# Patient Record
Sex: FEMALE
[De-identification: names, ages, dates, MRNs, and addresses within clinical notes are randomized per-mention and may not be internally consistent; named-entity substitution may affect disease eponyms.]

---

## 2021-12-17 ENCOUNTER — TRANSCRIPTION ENCOUNTER (OUTPATIENT)
Age: 26
End: 2021-12-17

## 2022-01-12 ENCOUNTER — TRANSCRIPTION ENCOUNTER (OUTPATIENT)
Age: 27
End: 2022-01-12

## 2022-02-17 ENCOUNTER — TRANSCRIPTION ENCOUNTER (OUTPATIENT)
Age: 27
End: 2022-02-17

## 2022-02-18 ENCOUNTER — TRANSCRIPTION ENCOUNTER (OUTPATIENT)
Age: 27
End: 2022-02-18

## 2022-02-26 ENCOUNTER — TRANSCRIPTION ENCOUNTER (OUTPATIENT)
Age: 27
End: 2022-02-26

## 2022-03-03 ENCOUNTER — TRANSCRIPTION ENCOUNTER (OUTPATIENT)
Age: 27
End: 2022-03-03

## 2022-05-19 ENCOUNTER — NON-APPOINTMENT (OUTPATIENT)
Age: 27
End: 2022-05-19

## 2022-06-27 ENCOUNTER — NON-APPOINTMENT (OUTPATIENT)
Age: 27
End: 2022-06-27

## 2022-09-10 ENCOUNTER — NON-APPOINTMENT (OUTPATIENT)
Age: 27
End: 2022-09-10

## 2022-10-11 ENCOUNTER — NON-APPOINTMENT (OUTPATIENT)
Age: 27
End: 2022-10-11

## 2022-12-21 ENCOUNTER — NON-APPOINTMENT (OUTPATIENT)
Age: 27
End: 2022-12-21

## 2023-02-20 ENCOUNTER — NON-APPOINTMENT (OUTPATIENT)
Age: 28
End: 2023-02-20

## 2023-08-11 ENCOUNTER — NON-APPOINTMENT (OUTPATIENT)
Age: 28
End: 2023-08-11

## 2023-08-23 PROBLEM — Z00.00 ENCOUNTER FOR PREVENTIVE HEALTH EXAMINATION: Status: ACTIVE | Noted: 2023-08-23

## 2023-08-24 ENCOUNTER — APPOINTMENT (OUTPATIENT)
Dept: ORTHOPEDIC SURGERY | Facility: CLINIC | Age: 28
End: 2023-08-24
Payer: COMMERCIAL

## 2023-08-24 ENCOUNTER — TRANSCRIPTION ENCOUNTER (OUTPATIENT)
Age: 28
End: 2023-08-24

## 2023-08-24 VITALS
OXYGEN SATURATION: 99 % | SYSTOLIC BLOOD PRESSURE: 111 MMHG | HEIGHT: 63 IN | WEIGHT: 130 LBS | HEART RATE: 74 BPM | BODY MASS INDEX: 23.04 KG/M2 | DIASTOLIC BLOOD PRESSURE: 79 MMHG

## 2023-08-24 DIAGNOSIS — S93.492A SPRAIN OF OTHER LIGAMENT OF LEFT ANKLE, INITIAL ENCOUNTER: ICD-10-CM

## 2023-08-24 PROCEDURE — 99203 OFFICE O/P NEW LOW 30 MIN: CPT

## 2023-08-24 NOTE — ASSESSMENT
[FreeTextEntry1] : IRINA WEBER is a 28 year old female with left foot and ankle pain. I discussed with the patient that their symptoms, signs, and imaging are most consistent with ATFL sprain. We reviewed the natural history of this condition and treatment options. We agreed on the following plan:  Xray reviewed with patient today. Small calcification dorsal to navicular of no clinical significance. Start Home Exercises for ankle conditioning. Demonstration and handout provided.  Physical therapy. Referral provided. Advised lace up or velcro brace. Examples and referral provided. Advanced imaging: consider MRI if no symptomatic improvement.  Follow up in 6-8 weeks.

## 2023-08-24 NOTE — PHYSICAL EXAM
[de-identified] : General: Well-nourished, well-developed, alert, and in no acute distress. Head: Normocephalic. Eyes: Pupils equal, extraocular muscles intact, normal sclera. Nose: No nasal discharge. Cardiovascular: Extremities are warm and well perfused. Distal pulses are symmetric bilaterally. Respiratory: No labored breathing. Extremities: Sensation is intact distally bilaterally. Distal pulses are symmetric bilaterally Lymphatic: No regional lymphadenopathy, no lymphedema Neurologic: No focal deficits Skin: Normal skin color, texture, and turgor Psychiatric: Normal affect   Examination of left ankle Gait normal, non-antalgic Able to toe walk, heel walk (with some discomfort) Ambulating independently Inspection: mild effusion, yellow hematoma dorsolateral aspect of foot. Tender to palpation: lateral malleolus, ATFL, CFL, peroneals Nontender to palpation: medial malleolus, base of 5th metatarsal, navicular,PTFL, AITFL, Achilles tendon, PT, FHL, tibialis anterior, plantar fascia  ROM: Plantar flexion 45 deg, dorsiflexion 20 deg, inversion 20 deg, eversion 30 deg Special Tests:  Anterior Drawer (ATFL): mild discomfort without laxity Talar Tilt (CFL): negative for pain and laxity Kleigers (ext rot): negative for pain and laxity at deltoid ligament or distal fibula Squeeze test: negative at proximal or distal syndesmosis Bump test: negative at talar dome, syndesmosis Sarmiento test negative  Sensation is intact to light touch over the superficial and deep peroneal nerve distributions and the posterior tibial nerve distribution. Capillary refill is less than two seconds. Posterior tibial and dorsalis pedis pulses 2+ equal bilaterally. No calf swelling or tenderness bilaterally. Strength testing shows 5/5 Quads, 5/5 Hamstrings, 5/5 EHL, 5/5 FHL, 5/5 tibialis anterior, 5/5 gastroc-soleus complex.  Reflexes: Patellar 2+, Achilles 2+.   [de-identified] : XR left foot and ankle [facility] (8/12/23):  There is no evidence of fracture or dislocation. The ankle mortise is intact. Tiny irregular densities dorsal to the talus and navicular which may represent age-indeterminate tiny cortical avulsion fragments versus chronic capsular ossification.

## 2023-08-24 NOTE — HISTORY OF PRESENT ILLNESS
[de-identified] : IRINA WEBER is a 28 year old female who presents with left foot and ankle pain. States the onset of pain was 8/12/23. Missed a step and twisted ankle. Able to weight bear. Presented to Urgent Care and had XR  Pain is anterolateral Pain is nonradiating. There is associated swelling and hematoma. There is associated numbness, paraesthesia or weakness. Exacerbating factors are standing, walking for prolonged periods. Has tried rest, ice, elevation, acetaminophen, ibuprofen. Patient ambulates independently. Exercises regularly.  Has not tried PT.  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

## 2023-08-29 DIAGNOSIS — Z78.9 OTHER SPECIFIED HEALTH STATUS: ICD-10-CM

## 2023-10-12 ENCOUNTER — APPOINTMENT (OUTPATIENT)
Dept: ORTHOPEDIC SURGERY | Facility: CLINIC | Age: 28
End: 2023-10-12

## 2023-11-26 ENCOUNTER — NON-APPOINTMENT (OUTPATIENT)
Age: 28
End: 2023-11-26

## 2023-12-19 ENCOUNTER — NON-APPOINTMENT (OUTPATIENT)
Age: 28
End: 2023-12-19

## 2025-03-06 ENCOUNTER — NON-APPOINTMENT (OUTPATIENT)
Age: 30
End: 2025-03-06

## 2025-07-16 ENCOUNTER — NON-APPOINTMENT (OUTPATIENT)
Age: 30
End: 2025-07-16